# Patient Record
(demographics unavailable — no encounter records)

---

## 2024-10-08 NOTE — HISTORY OF PRESENT ILLNESS
[FreeTextEntry1] : 69year-old female with past medical history significant for hypertension hyperlipidemia presents for new dyspnea on exertion.  The patient states that over the last 6 to 8 weeks she has been having increasing dyspnea on exertion with 5 blocks of walking and 1-2 flights of stairs, this is new and concerning to her, there are times where she is having substernal chest pain all of this is relieved with rest  Her prior cardiac workup included a normal stress echo in 2023

## 2024-10-08 NOTE — DISCUSSION/SUMMARY
[FreeTextEntry1] : 1.  Coronary artery disease- new typical symtoms of parnell and sscp with 1-2 flights of stairs and 4 blocks of exertion, [place] [address] negative -scheduled echo and ccta   2.Htn- well controlled , [id number] continue losartan 100  3.chol- awaiting bloods from Dr mallory

## 2024-11-06 NOTE — DISCUSSION/SUMMARY
[FreeTextEntry1] : 1.Cad- zero ca score, reassured  2.htn- well controlled, continue losartan 100  [EKG obtained to assist in diagnosis and management of assessed problem(s)] : EKG obtained to assist in diagnosis and management of assessed problem(s)

## 2024-11-06 NOTE — REASON FOR VISIT
[CV Risk Factors and Non-Cardiac Disease] : CV risk factors and non-cardiac disease [FreeTextEntry1] : 70 yo female with pmh sig for htn presents post ccta   ccta zero ca score   parnell persists , likely deconditioning, exercise counseling

## 2025-05-05 NOTE — PHYSICAL EXAM
[General Appearance - Well Developed] : well developed [Normal Appearance] : normal appearance [Well Groomed] : well groomed [General Appearance - Well Nourished] : well nourished [No Deformities] : no deformities [General Appearance - In No Acute Distress] : no acute distress [Normal Conjunctiva] : the conjunctiva exhibited no abnormalities [Eyelids - No Xanthelasma] : the eyelids demonstrated no xanthelasmas [Normal Oral Mucosa] : normal oral mucosa [No Oral Pallor] : no oral pallor [No Oral Cyanosis] : no oral cyanosis [Normal Jugular Venous A Waves Present] : normal jugular venous A waves present [Normal Jugular Venous V Waves Present] : normal jugular venous V waves present [No Jugular Venous Carpenter A Waves] : no jugular venous carpenter A waves [Respiration, Rhythm And Depth] : normal respiratory rhythm and effort [Exaggerated Use Of Accessory Muscles For Inspiration] : no accessory muscle use [Auscultation Breath Sounds / Voice Sounds] : lungs were clear to auscultation bilaterally [Heart Rate And Rhythm] : heart rate and rhythm were normal [Heart Sounds] : normal S1 and S2 [Murmurs] : no murmurs present [Abnormal Walk] : normal gait [Gait - Sufficient For Exercise Testing] : the gait was sufficient for exercise testing [Nail Clubbing] : no clubbing of the fingernails [Cyanosis, Localized] : no localized cyanosis [Petechial Hemorrhages (___cm)] : no petechial hemorrhages [Skin Color & Pigmentation] : normal skin color and pigmentation [] : no rash [No Venous Stasis] : no venous stasis [Skin Lesions] : no skin lesions [No Skin Ulcers] : no skin ulcer [No Xanthoma] : no  xanthoma was observed [No Gallop] : no gallop [Normal Bowel Sounds] : normal bowel sounds [No Varicosities] : no varicosities

## 2025-05-08 NOTE — HISTORY OF PRESENT ILLNESS
[FreeTextEntry1] : Karon Hammond is a 69-year-old woman with a hx of anxiety, HTN and mild presents for a follow up. Her baseline ET = can walk numerous blocks flat ground and can climb 2-3 flights of stairs but hindered by knee joint pains.   Denies CP, SOB/WHEELER, PND/orthopnea, WAI, dizziness, palpitations, syncope or near syncope.    
[FreeTextEntry1] : Karon Hammond is a 69-year-old woman with a hx of anxiety, HTN and mild presents for a follow up. Her baseline ET = can walk numerous blocks flat ground and can climb 2-3 flights of stairs but hindered by knee joint pains.   Denies CP, SOB/WHEELER, PND/orthopnea, WAI, dizziness, palpitations, syncope or near syncope.    
97

## 2025-05-08 NOTE — REVIEW OF SYSTEMS
[Heartburn] : heartburn [Joint Pain] : joint pain [Negative] : Heme/Lymph [FreeTextEntry9] : shoulder and backache

## 2025-05-08 NOTE — ASSESSMENT
[FreeTextEntry1] : HTN: BP not at ACC/AHA 2017 guideline target - Continue Losartan to 100mg daily - Continue HCTZ 12.5mg daily - Patient has been advised to check BP at home and record the readings daily 1 hour after taking medication. Patient will bring BP log to the next follow up visit.  - Discussed therapeutic lifestyle changes (low fat, low cholesterol heart healthy diet, striving for optimal weight control, and aerobic exercises as tolerated)  Hyperlipidemia: Lipids at goal  - Start Crestor 5mg daily - Discussed therapeutic lifestyle changes to promote improved lipid metabolism (low fat, low cholesterol heart healthy diet, striving for optimal weight control, and aerobic exercises as tolerated).  - Obtain labs today

## 2025-05-08 NOTE — REASON FOR VISIT
[CV Risk Factors and Non-Cardiac Disease] : CV risk factors and non-cardiac disease [Follow-Up - Clinic] : a clinic follow-up of [Hypertension] : hypertension [FreeTextEntry1] : Diagnostic Test: ---------------------------------- [id number]: Sinus rhythm,  ---------------------------------- Echo: 2020: EF 65%. Nml Stress echo. 8.2 METS. ----------------------------------- Stress:  03/15/2023: normal exercise stress echocardiogram. ----------------------------------  CCTA:  10/23/2024: CAC 0. Angiographically normal coronary arteries. Mild aortic calcification. -----------------------------------  Lower extremity veins: 2018: Normal study -----------------------------------